# Patient Record
(demographics unavailable — no encounter records)

---

## 2024-11-22 NOTE — HISTORY OF PRESENT ILLNESS
[de-identified] : The patient is 84 years old female who has low back pain for 1 year. The patient also right leg pain. Claudication distance 1-2 blocks. Shopping cart sign positive. No clear cause of the symptoms.   Pain Level:  10 Duration of Symptoms:  1 year  Symptom course:  Getting worse Accident:  No   Previous Treatment:    Pain meds:  Yes, Tylenol and Motrin    Physical therapy:  No    Epidural steroid injection:  No

## 2024-11-22 NOTE — DISCUSSION/SUMMARY
[de-identified] : I examined, evaluated, and discussed with the patient. The patient has low back symptoms for 1 year. She also has right leg pain (L5 nerve root area) and claudication (1-2 blocks).   Based on physical exam and image findings, the patient diagnosis is lumbar spondylosis and possible lumbar spinal stenosis.   Treatment plan is medications PRN and PT.   The patient understands everything and all questions were answered. The patient will return in 6 weeks.

## 2024-11-22 NOTE — CONSULT LETTER
[Dear  ___] : Dear  [unfilled], [Consult Letter:] : I had the pleasure of evaluating your patient, [unfilled]. [Please see my note below.] : Please see my note below. [Consult Closing:] : Thank you very much for allowing me to participate in the care of this patient.  If you have any questions, please do not hesitate to contact me. [Sincerely,] : Sincerely, [FreeTextEntry3] : sE Haddad MD PhD

## 2024-11-22 NOTE — PHYSICAL EXAM
[de-identified] : The patient is alert, cooperative, and oriented x 3. Pupils are equal and round. The patient does not have skin rash.   Gait is slow and kyphotic. Back ROM is limited with pain. Tenderness at PVM. SLR negative. DTR normal range. Motor and sensory are basically normal throughout bilateral L/E. Circulation of legs is normal. Bladder and bowel functions are normal. [de-identified] : Lumbar spine x-ray taken today 4 views show mild lumbar scoliosis and loss of lumbar lordosis and lumbar spondylosis. Mild instability at L4-5 with flex/ext.  AP Pelvis x-ray taken today show mild osteoarthritis of bilateral hip joint.
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
## 2025-01-08 NOTE — PHYSICAL EXAM
[Normal Breath Sounds] : Normal breath sounds [Normal Rate and Rhythm] : normal rate and rhythm [2+] : left 2+ [1+] : left 1+ [Skin Ulcer] : ulcer [Oriented to Place] : oriented to place [Calm] : calm [JVD] : no jugular venous distention  [Ankle Swelling (On Exam)] : not present [Abdomen Tenderness] : ~T ~M No abdominal tenderness [Alert] : not alert [Oriented to Time] : disoriented to time [de-identified] : nad [de-identified] : slow [FreeTextEntry1] : RIGHT THIGH [FreeTextEntry2] : 14 [FreeTextEntry3] : 3 [FreeTextEntry4] : .1CM [de-identified] : SSD [FreeTextEntry7] : TOES 2,3,4 RIGHT [FreeTextEntry8] : .5CM [FreeTextEntry9] : .5CM [de-identified] : .1CM [de-identified] : SSD
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
## 2025-02-10 NOTE — PHYSICAL EXAM
[de-identified] : Left shoulder exam:  Inspection: No malalignment, No defects, No atrophy, +effusion.  Skin: No masses, No lesions Neck: Negative Spurling, full ROM, no pain with ROM AROM: FF to 70, abduction to 50, ER to 10, IR to lower lumbar Painful arc ROM: none Tenderness: No bicipital tenderness, no tenderness to greater tuberosity/RTC insertion, no anterior shoulder/lesser tuberosity tenderness Strength: 3/5 ER, 4/5 IR in adduction, 3/5 supraspinatus testing, +Morgan's test AC joint: No TTP/pain with cross arm testing Biceps: Speed Negative, Yergason Negative  Impingement test: +Cespedes, +Neer Vasc: 2+ radial pulse  Stability: Stable  Neuro: AIN, PIN, Ulnar nerve intact to motor Sensation: Intact to light touch throughout  [de-identified] : 3 views of left shoulder were obtained Nov 2023 that show no acute fracture or dislocation. There is moderate glenohumeral with progression and mild AC joint degenerative change seen. Type II acromion. High-riding humeral head. Acetabularization of the acromion

## 2025-02-10 NOTE — HISTORY OF PRESENT ILLNESS
[de-identified] : 84-year-old female presents for follow-up of left shoulder rotator cuff arthropathy. She received an aspiration and steroid injection in the left shoulder in November 2024 as well as July 2024 giving her significant relief for a few months. She is here for a PT referral.  The pain is constant and worse with the movement of the arm. She is taking Tylenol for pain.

## 2025-02-10 NOTE — DISCUSSION/SUMMARY
[de-identified] : 84-year-old female with left shoulder rotator cuff arthropathy  Patient presents with continued swelling and effusion left shoulder with associated pain with known rotator cuff arthropathy. We reviewed that progression/continued symptoms is common due to the loss of the compressive effects of the rotator cuff as well as progression of functional decline.  Attempt today for reaspiration of the left shoulder without success.  Would consider aspiration/injection therapy during periods of acute exacerbation.  Recommendation: Begin a trial of PT. Rx given. Continued conservative care: NSAIDs, ice, HEP. Activity restriction/modification.  Follow-up as needed

## 2025-02-10 NOTE — ADDENDUM
[FreeTextEntry1] : This note was written by Palak Zhou on 02/10/2025 acting solely as a scribe for Dr. Mario King.   All medical record entries made by the Scribe were at my, Dr. Mario King, direction and personally dictated by me on 02/10/2025. I have personally reviewed the chart and agree that the record accurately reflects my personal performance of the history, physical exam, assessment and plan.

## 2025-02-10 NOTE — PROCEDURE
[de-identified] : Aspiration: Left shoulder (Subacromial). Indication: Effusion/rotator cuff arthropathy.  A discussion was had with the patient regarding this procedure and all questions were answered. All risks, benefits and alternatives were discussed. These included but were not limited to bleeding, infection, and allergic reaction. Alcohol was used to clean the skin, and betadine was used to sterilize and prep the area in the posterior aspect of the left shoulder. Ethyl chloride spray was then used as a topical anesthetic. An 18-gauge needle was used to aspirate the shoulder. Repeated attempts were made to remove the fluid but no fluid was encountered. A sterile bandage was then applied. The patient tolerated the procedure well.

## 2025-03-07 NOTE — CONSULT LETTER
[Dear  ___] : Dear  [unfilled], [Consult Letter:] : I had the pleasure of evaluating your patient, [unfilled]. [Please see my note below.] : Please see my note below. [Consult Closing:] : Thank you very much for allowing me to participate in the care of this patient.  If you have any questions, please do not hesitate to contact me. [Sincerely,] : Sincerely, [FreeTextEntry3] : Es Haddad MD PhD

## 2025-03-07 NOTE — HISTORY OF PRESENT ILLNESS
[de-identified] : Follow-up of low back pain and right leg pain She is doing better with PT.  Below is the history of initial visit. The patient is 84 years old female who has low back pain for 1 year. The patient also right leg pain. Claudication distance 1-2 blocks. Shopping cart sign positive. No clear cause of the symptoms.   Pain Level:  10 Duration of Symptoms:  1 year  Symptom course:  Getting worse Accident:  No   Previous Treatment:    Pain meds:  Yes, Tylenol and Motrin    Physical therapy:  No    Epidural steroid injection:  No

## 2025-03-07 NOTE — DISCUSSION/SUMMARY
[de-identified] : I examined, evaluated, and discussed with the patient. Follow-up of low back pain and right leg pain She has right leg pain (L5 nerve root area) and claudication (1-2 blocks). She is doing better with PT.   Based on physical exam and image findings, the patient diagnosis is lumbar spondylosis and possible lumbar spinal stenosis.   Treatment plan is medications PRN and to continue PT and home exercise. She is not interested in going to next step such as [facility] and MRI.   The patient understands everything and all questions were answered. The patient will return in 6-8 weeks.

## 2025-03-07 NOTE — PHYSICAL EXAM
[de-identified] : The patient is alert, cooperative, and oriented x 3. Pupils are equal and round. The patient does not have skin rash.   Gait is slow and kyphotic. Back ROM is limited with pain. Tenderness at PVM. SLR negative. DTR normal range. Motor and sensory are basically normal throughout bilateral L/E. Circulation of legs is normal. Bladder and bowel functions are normal. [de-identified] : Lumbar spine x-ray taken today 4 views show mild lumbar scoliosis and loss of lumbar lordosis and lumbar spondylosis. Mild instability at L4-5 with flex/ext.  AP Pelvis x-ray taken today show mild osteoarthritis of bilateral hip joint.
